# Patient Record
Sex: MALE | Race: WHITE
[De-identification: names, ages, dates, MRNs, and addresses within clinical notes are randomized per-mention and may not be internally consistent; named-entity substitution may affect disease eponyms.]

---

## 2018-01-19 ENCOUNTER — HOSPITAL ENCOUNTER (EMERGENCY)
Dept: HOSPITAL 88 - ER | Age: 48
Discharge: HOME | End: 2018-01-19
Payer: COMMERCIAL

## 2018-01-19 VITALS — WEIGHT: 220 LBS | BODY MASS INDEX: 32.58 KG/M2 | HEIGHT: 69 IN

## 2018-01-19 VITALS — DIASTOLIC BLOOD PRESSURE: 132 MMHG | SYSTOLIC BLOOD PRESSURE: 196 MMHG

## 2018-01-19 DIAGNOSIS — Z23: ICD-10-CM

## 2018-01-19 DIAGNOSIS — S90.412A: ICD-10-CM

## 2018-01-19 DIAGNOSIS — S97.112A: Primary | ICD-10-CM
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
## 2018-01-19 DIAGNOSIS — I10: ICD-10-CM

## 2018-01-19 PROCEDURE — 90714 TD VACC NO PRESV 7 YRS+ IM: CPT

## 2018-01-19 PROCEDURE — 99283 EMERGENCY DEPT VISIT LOW MDM: CPT

## 2018-01-19 NOTE — DIAGNOSTIC IMAGING REPORT
PROCEDURE:X-RAY LEFT TOES, MINIMUM TWO VIEWS

 

COMPARISON:None.

 

INDICATIONS:CRUSHED LEFT GREAT TOE

 

FINDINGS:

BONES:Normal mineralization. No acute displaced fracture or 

dislocation. No lytic or blastic lesion. Joint spaces are preserved.

SOFT TISSUES:Soft tissue swelling in the first toe.

OTHER:Negative.

 

 

CONCLUSION:Soft tissues in the first toe. No underlying acute 

displaced fracture or dislocation. 

 

 

 

Nick Egan M.D.  

Dictated by:  Nick Egan M.D. on 1/19/2018 at 11:54     

Electronically approved by:  Nick Egan M.D. on 1/19/2018 at 

11:54